# Patient Record
Sex: MALE | Race: WHITE | NOT HISPANIC OR LATINO | Employment: OTHER | URBAN - NONMETROPOLITAN AREA
[De-identification: names, ages, dates, MRNs, and addresses within clinical notes are randomized per-mention and may not be internally consistent; named-entity substitution may affect disease eponyms.]

---

## 2019-11-08 ENCOUNTER — OFFICE VISIT (OUTPATIENT)
Dept: FAMILY MEDICINE CLINIC | Facility: CLINIC | Age: 24
End: 2019-11-08

## 2019-11-08 VITALS
HEIGHT: 72 IN | HEART RATE: 64 BPM | SYSTOLIC BLOOD PRESSURE: 120 MMHG | BODY MASS INDEX: 20.86 KG/M2 | DIASTOLIC BLOOD PRESSURE: 78 MMHG | OXYGEN SATURATION: 97 % | WEIGHT: 154 LBS | TEMPERATURE: 98.3 F

## 2019-11-08 DIAGNOSIS — Z76.89 ENCOUNTER TO ESTABLISH CARE: ICD-10-CM

## 2019-11-08 DIAGNOSIS — D69.0 HSP (HENOCH SCHONLEIN PURPURA) (HCC): ICD-10-CM

## 2019-11-08 DIAGNOSIS — G89.4 CHRONIC PAIN SYNDROME: Primary | ICD-10-CM

## 2019-11-08 PROCEDURE — 99203 OFFICE O/P NEW LOW 30 MIN: CPT | Performed by: FAMILY MEDICINE

## 2019-11-08 RX ORDER — BUPRENORPHINE AND NALOXONE 8; 2 MG/1; MG/1
1 FILM, SOLUBLE BUCCAL; SUBLINGUAL DAILY
COMMUNITY
End: 2019-11-13

## 2019-11-08 NOTE — PROGRESS NOTES
Subjective   Bryson Castillo is a 24 y.o. male.   Pt presents today with CC of Addiction Problem      History of Present Illness   1.  Patient is here to establish care.  #2 he reports that he has recently moved here from [facility].  He has a history of chronic pain.  He reports that when he was 15 years old he had a flare of HSP, he was treated temporarily with narcotics, over the years he became addicted, he was part of a Suboxone clinic there under Dr. Solorzano.. He states that he moved here to [place] for no particular reason.  He has rented a trailer here in town.  He has not found work yet.  His only medication is 8 mg Suboxone.  He reports that he has enough medicine to last month, thus allowing him time to get in with a Suboxone clinic.  No other concerns today.         The following portions of the patient's history were reviewed and updated as appropriate: allergies, current medications, past family history, past social history, past surgical history and problem list.    Review of Systems   Constitutional: Negative for chills, fever and unexpected weight loss.   HENT: Negative for congestion and sore throat.    Eyes: Negative for blurred vision and visual disturbance.   Respiratory: Negative for cough and wheezing.    Cardiovascular: Negative for chest pain and palpitations.   Gastrointestinal: Negative for abdominal pain and diarrhea.   Endocrine: Negative for cold intolerance and heat intolerance.   Genitourinary: Negative for dysuria.   Musculoskeletal: Negative for arthralgias and neck stiffness.   Neurological: Negative for dizziness, seizures and syncope.   Psychiatric/Behavioral: Negative for self-injury, suicidal ideas and depressed mood.       Objective   Physical Exam   Constitutional: He is oriented to person, place, and time. He appears well-developed and well-nourished.   HENT:   Head: Normocephalic and atraumatic.   Right Ear: External ear normal.   Left Ear: External ear normal.   Nose:  Nose normal.   Mouth/Throat: Oropharynx is clear and moist.   Eyes: Conjunctivae and EOM are normal. Pupils are equal, round, and reactive to light.   Neck: Normal range of motion. Neck supple.   Cardiovascular: Normal rate, regular rhythm and normal heart sounds.   Pulmonary/Chest: Effort normal and breath sounds normal.   Abdominal: Soft. Bowel sounds are normal.   Neurological: He is alert and oriented to person, place, and time.   Skin: Skin is warm and dry.   Psychiatric: He has a normal mood and affect. His behavior is normal.   Vitals reviewed.        Assessment/Plan   Bryson was seen today for addiction problem.    Diagnoses and all orders for this visit:    Chronic pain syndrome  -     Ambulatory Referral to Psychiatry  Will refer him to psychiatry.  He has no other concerns today.  His pain is currently well controlled.  Encounter to establish care  He is to follow-up in 6 months for annual physical.  HSP (Henoch Schonlein purpura) (CMS/HCC)  He has a history of this, it is not currently active.                 Patient's Body mass index is 20.89 kg/m². BMI is above normal parameters. Recommendations include: exercise counseling and nutrition counseling.

## 2019-11-13 ENCOUNTER — OFFICE VISIT (OUTPATIENT)
Dept: PSYCHIATRY | Facility: CLINIC | Age: 24
End: 2019-11-13

## 2019-11-13 ENCOUNTER — LAB (OUTPATIENT)
Dept: LAB | Facility: HOSPITAL | Age: 24
End: 2019-11-13

## 2019-11-13 VITALS
BODY MASS INDEX: 21.48 KG/M2 | WEIGHT: 158.6 LBS | HEART RATE: 60 BPM | SYSTOLIC BLOOD PRESSURE: 124 MMHG | HEIGHT: 72 IN | DIASTOLIC BLOOD PRESSURE: 76 MMHG

## 2019-11-13 DIAGNOSIS — F11.21 OPIOID USE DISORDER, MODERATE, IN SUSTAINED REMISSION, DEPENDENCE (HCC): Primary | ICD-10-CM

## 2019-11-13 DIAGNOSIS — Z79.899 MEDICATION MANAGEMENT: ICD-10-CM

## 2019-11-13 DIAGNOSIS — Z72.89 OTHER PROBLEMS RELATED TO LIFESTYLE: ICD-10-CM

## 2019-11-13 DIAGNOSIS — F11.11 OPIOID ABUSE, IN REMISSION (HCC): ICD-10-CM

## 2019-11-13 DIAGNOSIS — F11.21 OPIOID USE DISORDER, MODERATE, IN SUSTAINED REMISSION, DEPENDENCE (HCC): ICD-10-CM

## 2019-11-13 LAB
ALBUMIN SERPL-MCNC: 4.4 G/DL (ref 3.5–5.2)
ALBUMIN/GLOB SERPL: 1.3 G/DL
ALP SERPL-CCNC: 93 U/L (ref 39–117)
ALT SERPL W P-5'-P-CCNC: 14 U/L (ref 1–41)
AMPHETAMINE CUT-OFF: ABNORMAL
ANION GAP SERPL CALCULATED.3IONS-SCNC: 9.2 MMOL/L (ref 5–15)
AST SERPL-CCNC: 15 U/L (ref 1–40)
BASOPHILS # BLD AUTO: 0.03 10*3/MM3 (ref 0–0.2)
BASOPHILS NFR BLD AUTO: 0.4 % (ref 0–1.5)
BENZODIAZIPINE CUT-OFF: ABNORMAL
BILIRUB SERPL-MCNC: 1.4 MG/DL (ref 0.2–1.2)
BUN BLD-MCNC: 11 MG/DL (ref 6–20)
BUN/CREAT SERPL: 12.8 (ref 7–25)
BUPRENORPHINE CUT-OFF: ABNORMAL
CALCIUM SPEC-SCNC: 9.5 MG/DL (ref 8.6–10.5)
CHLORIDE SERPL-SCNC: 99 MMOL/L (ref 98–107)
CO2 SERPL-SCNC: 30.8 MMOL/L (ref 22–29)
COCAINE CUT-OFF: ABNORMAL
CREAT BLD-MCNC: 0.86 MG/DL (ref 0.76–1.27)
DEPRECATED RDW RBC AUTO: 42.7 FL (ref 37–54)
EOSINOPHIL # BLD AUTO: 0.06 10*3/MM3 (ref 0–0.4)
EOSINOPHIL NFR BLD AUTO: 0.8 % (ref 0.3–6.2)
ERYTHROCYTE [DISTWIDTH] IN BLOOD BY AUTOMATED COUNT: 13 % (ref 12.3–15.4)
EXTERNAL AMPHETAMINE SCREEN URINE: NEGATIVE
EXTERNAL BENZODIAZEPINE SCREEN URINE: NEGATIVE
EXTERNAL BUPRENORPHINE SCREEN URINE: POSITIVE
EXTERNAL COCAINE SCREEN URINE: NEGATIVE
EXTERNAL MDMA: NEGATIVE
EXTERNAL METHADONE SCREEN URINE: NEGATIVE
EXTERNAL METHAMPHETAMINE SCREEN URINE: NEGATIVE
EXTERNAL OPIATES SCREEN URINE: NEGATIVE
EXTERNAL OXYCODONE SCREEN URINE: NEGATIVE
EXTERNAL THC SCREEN URINE: NEGATIVE
GFR SERPL CREATININE-BSD FRML MDRD: 108 ML/MIN/1.73
GLOBULIN UR ELPH-MCNC: 3.4 GM/DL
GLUCOSE BLD-MCNC: 80 MG/DL (ref 65–99)
HAV IGM SERPL QL IA: NORMAL
HBV CORE IGM SERPL QL IA: NORMAL
HBV SURFACE AG SERPL QL IA: NORMAL
HCT VFR BLD AUTO: 41.8 % (ref 37.5–51)
HCV AB SER DONR QL: NORMAL
HGB BLD-MCNC: 13.5 G/DL (ref 13–17.7)
HIV1+2 AB SER QL: NORMAL
IMM GRANULOCYTES # BLD AUTO: 0.01 10*3/MM3 (ref 0–0.05)
IMM GRANULOCYTES NFR BLD AUTO: 0.1 % (ref 0–0.5)
LYMPHOCYTES # BLD AUTO: 1.53 10*3/MM3 (ref 0.7–3.1)
LYMPHOCYTES NFR BLD AUTO: 21.7 % (ref 19.6–45.3)
MCH RBC QN AUTO: 29.2 PG (ref 26.6–33)
MCHC RBC AUTO-ENTMCNC: 32.3 G/DL (ref 31.5–35.7)
MCV RBC AUTO: 90.3 FL (ref 79–97)
MDMA CUT-OFF: ABNORMAL
METHADONE CUT-OFF: ABNORMAL
METHAMPHETAMINE CUT-OFF: ABNORMAL
MONOCYTES # BLD AUTO: 0.49 10*3/MM3 (ref 0.1–0.9)
MONOCYTES NFR BLD AUTO: 6.9 % (ref 5–12)
NEUTROPHILS # BLD AUTO: 4.94 10*3/MM3 (ref 1.7–7)
NEUTROPHILS NFR BLD AUTO: 70.1 % (ref 42.7–76)
NRBC BLD AUTO-RTO: 0 /100 WBC (ref 0–0.2)
OPIATES CUT-OFF: ABNORMAL
OXYCODONE CUT-OFF: ABNORMAL
PLATELET # BLD AUTO: 247 10*3/MM3 (ref 140–450)
PMV BLD AUTO: 11.5 FL (ref 6–12)
POTASSIUM BLD-SCNC: 4.3 MMOL/L (ref 3.5–5.2)
PROT SERPL-MCNC: 7.8 G/DL (ref 6–8.5)
RBC # BLD AUTO: 4.63 10*6/MM3 (ref 4.14–5.8)
SODIUM BLD-SCNC: 139 MMOL/L (ref 136–145)
T4 FREE SERPL-MCNC: 1.31 NG/DL (ref 0.93–1.7)
THC CUT-OFF: ABNORMAL
TSH SERPL DL<=0.05 MIU/L-ACNC: 2.23 UIU/ML (ref 0.27–4.2)
WBC NRBC COR # BLD: 7.06 10*3/MM3 (ref 3.4–10.8)

## 2019-11-13 PROCEDURE — 84439 ASSAY OF FREE THYROXINE: CPT

## 2019-11-13 PROCEDURE — 84443 ASSAY THYROID STIM HORMONE: CPT

## 2019-11-13 PROCEDURE — 80074 ACUTE HEPATITIS PANEL: CPT

## 2019-11-13 PROCEDURE — 36415 COLL VENOUS BLD VENIPUNCTURE: CPT

## 2019-11-13 PROCEDURE — 80053 COMPREHEN METABOLIC PANEL: CPT

## 2019-11-13 PROCEDURE — G0432 EIA HIV-1/HIV-2 SCREEN: HCPCS

## 2019-11-13 PROCEDURE — 85025 COMPLETE CBC W/AUTO DIFF WBC: CPT | Performed by: NURSE PRACTITIONER

## 2019-11-13 PROCEDURE — 99204 OFFICE O/P NEW MOD 45 MIN: CPT | Performed by: NURSE PRACTITIONER

## 2019-11-13 RX ORDER — BUPRENORPHINE HYDROCHLORIDE AND NALOXONE HYDROCHLORIDE DIHYDRATE 8; 2 MG/1; MG/1
1 TABLET SUBLINGUAL DAILY
Qty: 13 TABLET | Refills: 0 | Status: SHIPPED | OUTPATIENT
Start: 2019-11-13 | End: 2019-11-25 | Stop reason: SDUPTHER

## 2019-11-25 ENCOUNTER — OFFICE VISIT (OUTPATIENT)
Dept: PSYCHIATRY | Facility: CLINIC | Age: 24
End: 2019-11-25

## 2019-11-25 VITALS
SYSTOLIC BLOOD PRESSURE: 130 MMHG | WEIGHT: 157.4 LBS | HEIGHT: 72 IN | HEART RATE: 77 BPM | BODY MASS INDEX: 21.32 KG/M2 | DIASTOLIC BLOOD PRESSURE: 77 MMHG

## 2019-11-25 DIAGNOSIS — F11.21 OPIOID USE DISORDER, MODERATE, IN SUSTAINED REMISSION, DEPENDENCE (HCC): Primary | ICD-10-CM

## 2019-11-25 DIAGNOSIS — Z79.899 MEDICATION MANAGEMENT: ICD-10-CM

## 2019-11-25 LAB
AMPHETAMINE CUT-OFF: ABNORMAL
BENZODIAZIPINE CUT-OFF: ABNORMAL
BUPRENORPHINE CUT-OFF: ABNORMAL
COCAINE CUT-OFF: ABNORMAL
EXTERNAL AMPHETAMINE SCREEN URINE: NEGATIVE
EXTERNAL BENZODIAZEPINE SCREEN URINE: NEGATIVE
EXTERNAL BUPRENORPHINE SCREEN URINE: POSITIVE
EXTERNAL COCAINE SCREEN URINE: NEGATIVE
EXTERNAL MDMA: NEGATIVE
EXTERNAL METHADONE SCREEN URINE: NEGATIVE
EXTERNAL METHAMPHETAMINE SCREEN URINE: NEGATIVE
EXTERNAL OPIATES SCREEN URINE: NEGATIVE
EXTERNAL OXYCODONE SCREEN URINE: NEGATIVE
EXTERNAL THC SCREEN URINE: NEGATIVE
MDMA CUT-OFF: ABNORMAL
METHADONE CUT-OFF: ABNORMAL
METHAMPHETAMINE CUT-OFF: ABNORMAL
OPIATES CUT-OFF: ABNORMAL
OXYCODONE CUT-OFF: ABNORMAL
THC CUT-OFF: ABNORMAL

## 2019-11-25 PROCEDURE — 99212 OFFICE O/P EST SF 10 MIN: CPT | Performed by: NURSE PRACTITIONER

## 2019-11-25 RX ORDER — BUPRENORPHINE HYDROCHLORIDE AND NALOXONE HYDROCHLORIDE DIHYDRATE 8; 2 MG/1; MG/1
1 TABLET SUBLINGUAL DAILY
Qty: 7 TABLET | Refills: 0 | Status: SHIPPED | OUTPATIENT
Start: 2019-11-25 | End: 2019-12-02 | Stop reason: SDUPTHER

## 2019-11-26 ENCOUNTER — PROCEDURE VISIT (OUTPATIENT)
Dept: PSYCHIATRY | Facility: CLINIC | Age: 24
End: 2019-11-26

## 2019-11-26 DIAGNOSIS — F11.21 OPIOID USE DISORDER, MODERATE, IN SUSTAINED REMISSION, DEPENDENCE (HCC): Primary | ICD-10-CM

## 2019-11-26 PROCEDURE — 90853 GROUP PSYCHOTHERAPY: CPT | Performed by: SOCIAL WORKER

## 2019-11-28 NOTE — PROGRESS NOTES
"Patient ID: Bryson Castillo is a 24 y.o. male    SERVICE TYPE: EVALUATION for MAT      /76   Pulse 60   Ht 182.9 cm (72\")   Wt 71.9 kg (158 lb 9.6 oz)   BMI 21.51 kg/m²     ALLERGIES:  Morphine    CC/ Focus of the visit:  \"I take suboxone and I just moved here and need to establish care\".    HPI: This is a 23 y/o male who is currently taking Buprenorphine/Naloxone 8/2 mg one tablet daily presents to establish care for this medication. He recently established care with Dr. Justin Dupont as his PCP and he was referred here for the buprenorphine. He has a history of HSP first diagnosed at age 6.He has a chronic pain syndrome related to this. He had a bad flare at age 6 and then again at age 12. He was born and raised in [place]. His parents were , but both are supportive. He has one brother who is currently in shelter due to drugs. He has a good relationship with both of his parents. He lived in [place] for a time and then moved to [place]. He lives here with his girlfriend and her 3 y/o son. He is hoping to go to college this spring to get a degree in computer science. His girlfriend works at a local factory.    Substance Abuse History: He started using marijuana at around age 12. He had been prescribed narcotics for his HSP and he feels he became addicted at that time and took pain pills, but mainly he used heroin. He states that he used multiple drugs, but his drug of choice was Heroin. He did use meth. He has used IV drugs. He went to rehab at least twice and shelter once. He would get sober, but then start using again. He had an OD. He then was started on buprenorphine by a provider in [place] and has been clean and sober for almost 2 years. While is [place] he used and he moved to [place] to get away from other users. His girlfriend does not and has not used drugs. He has never really used alcohol. He did smoke, but quit that approximately 2 years ago as well.    PFSH:  Father-living in his " 40's-no health problems  Mother-living in her 40's-no health problems, may over use prescription pills.  Brother-living in his 20's-drug abuse  Review of Systems    SUPPORTIVE PSYCHOTHERAPY: continuing efforts to promote the therapeutic alliance, address the patient’s issues, and strengthen self awareness, insights, and coping skills.       Mental Status Exam  Appearance:  clean and casually dressed, appropriate  Attitude toward clinician:  cooperative and agreeable   Speech:    Rate:  regular rate and rhythm   Volume: normal  Motor:  no abnormal movements present  Mood:  Good  Affect:  euthymic  Thought Processes:  linear, logical, and goal directed  Thought Content:  Normal   Feeling Hopeless: absent  Suicidal Thoughts:  absent  Homicidal Thoughts:  absent  Perceptual Disturbance: no perceptual disturbance  Attention and Concentration:  good  Insight and Judgement:  good  Memory:  memory appears to be intact    Physical Exam:  HEENT: Head atraumatic, pupils equally round reactive to light bilaterally, nares pale and boggy with clear secretions.  Mouth mucosa moist pink and intact  Neck: Supple, no lymphadenopathy  Heart: Regular rate and rhythm without murmur  Lungs: Clear throughout all to auscultation  Ext: No edema or cyanosis.  Moves all extremities normally.  Neuro: Cranial 2 through 12 are grossly intact  Skin: Pink warm and dry and intact.  No rashes or lesions.    LABS:   Recent Results (from the past 168 hour(s))   [facility] Drug Screen    Collection Time: 11/25/19  9:58 AM   Result Value Ref Range    External Amphetamine Screen Urine Negative     Amphetamine Cut-Off 1000ng/ml     External Benzodiazepine Screen Urine Negative     Benzodiazipine Cut-Off 300ng/ml     External Cocaine Screen Urine Negative     Cocaine Cut-Off 300ng/ml     External THC Screen Urine Negative     THC Cut-Off 50ng/ml     External Methadone Screen Urine Negative     Methadone Cut-Off 300ng/ml     External Methamphetamine Screen  Urine Negative     Methamphetamine Cut-Off 1000ng/ml     External Oxycodone Screen Urine Negative     Oxycodone Cut-Off 100ng/ml     External Buprenorphine Screen Urine Positive     Buprenorphine Cut-Off 10ng/ml     External MDMA Negative     MDMA Cut-Off 500ng/ml     External Opiates Screen Urine Negative     Opiates Cut-Off 300ng/ml        MEDICATION ISSUES: Have discussed with the patient the medications Risks, Benefits, and Side effects including potential falls, possible impaired driving and  metabolic adversities among others. No medication side effects or related complaints today.     TREATMENT PLAN/GOALS: Continue supportive psychotherapy efforts and medications as indicated.     Current Outpatient Medications   Medication Sig Dispense Refill   • buprenorphine-naloxone (SUBOXONE) 8-2 MG per SL tablet Place 1 tablet under the tongue Daily. 7 tablet 0     No current facility-administered medications for this visit.        COLLATERAL PSYCHOTHERAPEUTIC INTERVENTION: Patient's insurance will allow him to partake in IOP Level 2.    RISK:  Denies SI, denies depression, denies anxiety    Assessment/Plan   Will order Buprenorphine/naloxone 8/2 mg 1 tab daily. Will check screening lab. UDS was appropriate. Signed the controlled contract. Scott is appropriate. RTC in 2 weeks. He will start Phase II IOP after they find  for his girlfriend's son.  Diagnoses and all orders for this visit:    Opioid use disorder, moderate, in sustained remission, dependence (CMS/HCC)  -     CBC & Differential  -     Comprehensive Metabolic Panel; Future  -     HIV-1 & HIV-2 Antibodies; Future  -     Hepatitis Panel, Acute; Future  -     TSH; Future  -     T4, Free; Future  -     CBC Auto Differential    Medication management  -     [facility] Drug Screen  -     CBC & Differential  -     Comprehensive Metabolic Panel; Future  -     HIV-1 & HIV-2 Antibodies; Future  -     Hepatitis Panel, Acute; Future  -     TSH; Future  -     T4,  Free; Future  -     CBC Auto Differential    Opioid abuse, in remission (CMS/HCC)   -     Hepatitis Panel, Acute; Future    Other problems related to lifestyle   -     HIV-1 & HIV-2 Antibodies; Future    Other orders  -     Discontinue: buprenorphine-naloxone (SUBOXONE) 8-2 MG per SL tablet; Place 1 tablet under the tongue Daily.        Return in 12 days (on 11/25/2019) for Recheck.         Patient knows to call if symptoms worsen or fail to improve between appointments.        This document has been electronically signed by SELVIN Coyle, LILY  November 27, 2019 10:13 PM

## 2019-11-29 NOTE — PROGRESS NOTES
"Patient ID: Bryson Castillo is a 24 y.o. male    SERVICE TYPE: EVALUATION for MAT      /77   Pulse 77   Ht 182.9 cm (72\")   Wt 71.4 kg (157 lb 6.4 oz)   BMI 21.35 kg/m²     ALLERGIES:  Morphine    CC/ Focus of the visit:  MAT follow-up    HPI: This is a 25 y/o male who is currently taking Buprenorphine/Naloxone 8/2 mg one tablet daily presents to establish care for this medication. He recently established care with Dr. Justin Dupont as his PCP and he was referred here for the buprenorphine. He has a history of HSP first diagnosed at age 6.He has a chronic pain syndrome related to this. He had a bad flare at age 6 and then again at age 12. He was born and raised in [place]. His parents were , but both are supportive. He has one brother who is currently in care home due to drugs. He has a good relationship with both of his parents. He lived in [place] for a time and then moved to [place]. He lives here with his girlfriend and her 3 y/o son. He is hoping to go to college this spring to get a degree in computer science. His girlfriend works at a local factory. Bryson's insurance will not cover Phase I IOP, but will Phase II, so he is going to start that next week, as he and his girlfriend found  for her son.    Substance Abuse History: He started using marijuana at around age 12. He had been prescribed narcotics for his HSP and he feels he became addicted at that time and took pain pills, but mainly he used heroin. He states that he used multiple drugs, but his drug of choice was Heroin. He did use meth. He has used IV drugs. He went to rehab at least twice and care home once. He would get sober, but then start using again. He had an OD. He then was started on buprenorphine by a provider in [place] and has been clean and sober for almost 2 years. While is [place] he used and he moved to [place] to get away from other users. His girlfriend does not and has not used drugs. He has never really used " alcohol. He did smoke, but quit that approximately 2 years ago as well.    PFSH:  Father-living in his 40's-no health problems  Mother-living in her 40's-no health problems, may over use prescription pills.  Brother-living in his 20's-drug abuse  Review of Systems    SUPPORTIVE PSYCHOTHERAPY: continuing efforts to promote the therapeutic alliance, address the patient’s issues, and strengthen self awareness, insights, and coping skills.       Mental Status Exam  Appearance:  clean and casually dressed, appropriate  Attitude toward clinician:  cooperative and agreeable   Speech:    Rate:  regular rate and rhythm   Volume: normal  Motor:  no abnormal movements present  Mood:  Good  Affect:  euthymic  Thought Processes:  linear, logical, and goal directed  Thought Content:  Normal   Feeling Hopeless: absent  Suicidal Thoughts:  absent  Homicidal Thoughts:  absent  Perceptual Disturbance: no perceptual disturbance  Attention and Concentration:  good  Insight and Judgement:  good  Memory:  memory appears to be intact    Physical Exam:  HEENT: Head atraumatic, pupils equally round reactive to light bilaterally, nares pale and boggy with clear secretions.  Mouth mucosa moist pink and intact  Neck: Supple, no lymphadenopathy  Heart: Regular rate and rhythm without murmur  Lungs: Clear throughout all to auscultation  Ext: No edema or cyanosis.  Moves all extremities normally.  Neuro: Cranial 2 through 12 are grossly intact  Skin: Pink warm and dry and intact.  No rashes or lesions.    LABS:   Recent Results (from the past 168 hour(s))   [facility] Drug Screen    Collection Time: 11/25/19  9:58 AM   Result Value Ref Range    External Amphetamine Screen Urine Negative     Amphetamine Cut-Off 1000ng/ml     External Benzodiazepine Screen Urine Negative     Benzodiazipine Cut-Off 300ng/ml     External Cocaine Screen Urine Negative     Cocaine Cut-Off 300ng/ml     External THC Screen Urine Negative     THC Cut-Off 50ng/ml     External  Methadone Screen Urine Negative     Methadone Cut-Off 300ng/ml     External Methamphetamine Screen Urine Negative     Methamphetamine Cut-Off 1000ng/ml     External Oxycodone Screen Urine Negative     Oxycodone Cut-Off 100ng/ml     External Buprenorphine Screen Urine Positive     Buprenorphine Cut-Off 10ng/ml     External MDMA Negative     MDMA Cut-Off 500ng/ml     External Opiates Screen Urine Negative     Opiates Cut-Off 300ng/ml        MEDICATION ISSUES: Have discussed with the patient the medications Risks, Benefits, and Side effects including potential falls, possible impaired driving and  metabolic adversities among others. No medication side effects or related complaints today.     TREATMENT PLAN/GOALS: Continue supportive psychotherapy efforts and medications as indicated.     Current Outpatient Medications   Medication Sig Dispense Refill   • buprenorphine-naloxone (SUBOXONE) 8-2 MG per SL tablet Place 1 tablet under the tongue Daily. 7 tablet 0     No current facility-administered medications for this visit.        COLLATERAL PSYCHOTHERAPEUTIC INTERVENTION: Patient's insurance will allow him to partake in IOP Level 2.    RISK:  Denies SI, denies depression, denies anxiety    Assessment/Plan   Will order Buprenorphine/naloxone 8/2 mg 1 tab daily. We reviewed his screening lab and the CBC, TSH/Free T4, CMP were all normal. Hepatitis panel and HIV were negative. He will start IOP next week. RTC on 12-2-19.       Diagnoses and all orders for this visit:    Opioid use disorder, moderate, in sustained remission, dependence (CMS/HCC)  -     [facility] Drug Screen  -     buprenorphine-naloxone (SUBOXONE) 8-2 MG per SL tablet; Place 1 tablet under the tongue Daily.    Medication management  -     [facility] Drug Screen  -     buprenorphine-naloxone (SUBOXONE) 8-2 MG per SL tablet; Place 1 tablet under the tongue Daily.        Return in 7 days (on 12/2/2019) for Recheck.         Patient knows to call if symptoms worsen or  fail to improve between appointments.        This document has been electronically signed by SELVIN Coyle DNP  November 29, 2019 9:44 AM

## 2019-12-02 ENCOUNTER — OFFICE VISIT (OUTPATIENT)
Dept: PSYCHIATRY | Facility: CLINIC | Age: 24
End: 2019-12-02

## 2019-12-02 VITALS
SYSTOLIC BLOOD PRESSURE: 135 MMHG | HEART RATE: 80 BPM | DIASTOLIC BLOOD PRESSURE: 72 MMHG | BODY MASS INDEX: 21.21 KG/M2 | WEIGHT: 156.6 LBS | HEIGHT: 72 IN

## 2019-12-02 DIAGNOSIS — Z79.899 MEDICATION MANAGEMENT: ICD-10-CM

## 2019-12-02 DIAGNOSIS — F11.21 OPIOID USE DISORDER, MODERATE, IN SUSTAINED REMISSION, DEPENDENCE (HCC): Primary | ICD-10-CM

## 2019-12-02 PROCEDURE — 99212 OFFICE O/P EST SF 10 MIN: CPT | Performed by: NURSE PRACTITIONER

## 2019-12-02 RX ORDER — BUPRENORPHINE HYDROCHLORIDE AND NALOXONE HYDROCHLORIDE DIHYDRATE 8; 2 MG/1; MG/1
1 TABLET SUBLINGUAL DAILY
Qty: 14 TABLET | Refills: 0 | Status: SHIPPED | OUTPATIENT
Start: 2019-12-02 | End: 2019-12-16 | Stop reason: SDUPTHER

## 2019-12-02 NOTE — GROUP NOTE
"November 26   1:00 PM   2:00 PM  SUBSTANCE ABUSE  IOP PHASE II GROUP NOTE    DATA:    1 hour group therapy session (Responsibility in Recovery)    Facilitated check-ins. Provided the Pie Chart Exercise from CBT workbook. Read from \"Just for Today\" the reading on responsibility for November 26th. Processed reading with the group.       RESPONSE: Positive participant. Reported his sobriety date was May 15th. He had just moved to [place] recently. He was still adjusting. His goal was to be connected with sober support during this transition.     CLINICAL MANUVERING/INTERVENTIONS: Assisted member in processing above session content; acknowledged and normalized patient's thoughts, feelings and concerns.    Allowed patient to freely discuss issues without interruption or judgment. Provided safe, confidential environment to facilitate the development of positive therapeutic relationship and encourage open, honest communication. Assisted patient in identifying risk factors which would indicate the need for higher level of care including thoughts to harm self or others and/or self-harming behavior and encouraged patient to contact this office, call 911, or present to the nearest emergency room should any of these events occur. Discussed crisis intervention services and means to access.  Patient adamantly and convincingly denies current suicidal or homicidal ideation or perceptual disturbance.    ASSESSMENT:  Engaged in activity/Process and self-disclosed: Yes or No  Affect ([place] all that apply) appropriate, blunted, flat, sad, angry, tearful, anxious, bright  Applies topic to self: Yes or No  Able to give and receive feedback: Yes or No  Degree of insightful thinking: Least 1  2  3  4  5  6  7 8  9  10  Most    Urinalysis:   No visits with results within 1 Day(s) from this visit.   Latest known visit with results is:   Office Visit on 11/25/2019   Component Date Value Ref Range Status   • External Amphetamine Screen Urine " 11/25/2019 Negative   Final   • Amphetamine Cut-Off 11/25/2019 1000ng/ml   Final   • External Benzodiazepine Screen Uri* 11/25/2019 Negative   Final   • Benzodiazipine Cut-Off 11/25/2019 300ng/ml   Final   • External Cocaine Screen Urine 11/25/2019 Negative   Final   • Cocaine Cut-Off 11/25/2019 300ng/ml   Final   • External THC Screen Urine 11/25/2019 Negative   Final   • THC Cut-Off 11/25/2019 50ng/ml   Final   • External Methadone Screen Urine 11/25/2019 Negative   Final   • Methadone Cut-Off 11/25/2019 300ng/ml   Final   • External Methamphetamine Screen Ur* 11/25/2019 Negative   Final   • Methamphetamine Cut-Off 11/25/2019 1000ng/ml   Final   • External Oxycodone Screen Urine 11/25/2019 Negative   Final   • Oxycodone Cut-Off 11/25/2019 100ng/ml   Final   • External Buprenorphine Screen Urine 11/25/2019 Positive   Final   • Buprenorphine Cut-Off 11/25/2019 10ng/ml   Final   • External MDMA 11/25/2019 Negative   Final   • MDMA Cut-Off 11/25/2019 500ng/ml   Final   • External Opiates Screen Urine 11/25/2019 Negative   Final   • Opiates Cut-Off 11/25/2019 300ng/ml   Final       12-Step attendance: Frequency/Sponsor? Not yet attended meetings. Resource list provided.    Identification of environmental and personal barriers to recovery: Recent transition to [place].     Motivation for treatment: Stability.     Mental Status Exam  Hygiene:  good  Dress:  casual  Attitude:  Cooperative  Motor Activity:  Appropriate  Speech:  Normal  Mood:  euthymic  Affect:  calm and pleasant  Thought Processes:  Goal directed and Linear  Thought Content:  normal  Suicidal Thoughts:  denies  Homicidal Thoughts:  denies  Crisis Safety Plan: yes, to come to the emergency room.  Hallucinations:  denies    Assessment     Diagnoses and all orders for this visit:    Opioid use disorder, moderate, in sustained remission, dependence (CMS/HCC)               PLAN:   Patient will continue in bi-weekly group psychotherapy sessions and individual  outpatient psychotherapy sessions every 3-4 weeks and will continue in self-help meetings and seek additional treatment if necessary following completion.

## 2019-12-06 ENCOUNTER — PROCEDURE VISIT (OUTPATIENT)
Dept: PSYCHIATRY | Facility: CLINIC | Age: 24
End: 2019-12-06

## 2019-12-06 DIAGNOSIS — F11.21 OPIOID USE DISORDER, MODERATE, IN SUSTAINED REMISSION, DEPENDENCE (HCC): Primary | ICD-10-CM

## 2019-12-06 DIAGNOSIS — F11.11 OPIOID ABUSE, IN REMISSION (HCC): ICD-10-CM

## 2019-12-06 PROCEDURE — 90853 GROUP PSYCHOTHERAPY: CPT | Performed by: SOCIAL WORKER

## 2019-12-12 DIAGNOSIS — F11.11 OPIOID ABUSE, IN REMISSION (HCC): ICD-10-CM

## 2019-12-12 DIAGNOSIS — F11.21 NARCOTIC DEPENDENCE, IN REMISSION (HCC): Primary | ICD-10-CM

## 2019-12-16 ENCOUNTER — OFFICE VISIT (OUTPATIENT)
Dept: PSYCHIATRY | Facility: CLINIC | Age: 24
End: 2019-12-16

## 2019-12-16 VITALS
BODY MASS INDEX: 20.94 KG/M2 | WEIGHT: 154.6 LBS | DIASTOLIC BLOOD PRESSURE: 74 MMHG | HEART RATE: 85 BPM | HEIGHT: 72 IN | SYSTOLIC BLOOD PRESSURE: 132 MMHG

## 2019-12-16 DIAGNOSIS — F11.21 OPIOID USE DISORDER, MODERATE, IN SUSTAINED REMISSION, DEPENDENCE (HCC): Primary | ICD-10-CM

## 2019-12-16 DIAGNOSIS — Z79.899 MEDICATION MANAGEMENT: ICD-10-CM

## 2019-12-16 PROCEDURE — 99213 OFFICE O/P EST LOW 20 MIN: CPT | Performed by: NURSE PRACTITIONER

## 2019-12-16 RX ORDER — BUPRENORPHINE HYDROCHLORIDE AND NALOXONE HYDROCHLORIDE DIHYDRATE 8; 2 MG/1; MG/1
1 TABLET SUBLINGUAL DAILY
Qty: 14 TABLET | Refills: 0 | Status: SHIPPED | OUTPATIENT
Start: 2019-12-16 | End: 2019-12-30 | Stop reason: SDUPTHER

## 2019-12-17 NOTE — GROUP NOTE
"December 6   9:00 AM  10:00 AM  SUBSTANCE ABUSE  IOP PHASE II GROUP NOTE    DATA:    1 hour group therapy session (Risk vs. Risk Choices)    Facilitated check-ins. Emphasized importance of attending meetings. Discussed the risks of attending meetings compared to the risks of not attending. Provided the worksheet, \"Risk versus Risk Choices\" from Cognitive Behavioral Therapy Techniques: A Practitioner's Guide, Second Edition, by Trace Peck. Processed worksheet with group participants, suggesting they input the choice of going to a meeting into the worksheet.       RESPONSE: Patient arrived 10 minutes late to the group session. Largely quiet. Seemed withdrawn from group. Apparent difficulty feeling a part of the group, though this has not been verbalized. Reported his son was sick, which was stressful, and he was dealing with boredom at home. He had not yet begun attending support group meetings in the area as advised.     CLINICAL MANUVERING/INTERVENTIONS: Assisted member in processing above session content; acknowledged and normalized patient's thoughts, feelings and concerns.    Allowed patient to freely discuss issues without interruption or judgment. Provided safe, confidential environment to facilitate the development of positive therapeutic relationship and encourage open, honest communication. Assisted patient in identifying risk factors which would indicate the need for higher level of care including thoughts to harm self or others and/or self-harming behavior and encouraged patient to contact this office, call 911, or present to the nearest emergency room should any of these events occur. Discussed crisis intervention services and means to access.  Patient adamantly and convincingly denies current suicidal or homicidal ideation or perceptual disturbance.    ASSESSMENT:  Engaged in activity/Process and self-disclosed: Yes or No  Affect ([place] all that apply) appropriate, blunted, flat, sad, angry, " tearful, anxious, bright  Applies topic to self: Yes or No  Able to give and receive feedback: Yes or No  Degree of insightful thinking: Least 1  2  3  4  5  6  7 8  9  10  Most    Urinalysis:   No visits with results within 1 Day(s) from this visit.   Latest known visit with results is:   Office Visit on 12/02/2019   Component Date Value Ref Range Status   • External Amphetamine Screen Urine 12/02/2019 Negative   Final   • Amphetamine Cut-Off 12/02/2019 1000ng/ml   Final   • External Benzodiazepine Screen Uri* 12/02/2019 Negative   Final   • Benzodiazipine Cut-Off 12/02/2019 300ng/ml   Final   • External Cocaine Screen Urine 12/02/2019 Negative   Final   • Cocaine Cut-Off 12/02/2019 300ng/ml   Final   • External THC Screen Urine 12/02/2019 Negative   Final   • THC Cut-Off 12/02/2019 50ng/ml   Final   • External Methadone Screen Urine 12/02/2019 Negative   Final   • Methadone Cut-Off 12/02/2019 300ng/ml   Final   • External Methamphetamine Screen Ur* 12/02/2019 Negative   Final   • Methamphetamine Cut-Off 12/02/2019 1000ng/ml   Final   • External Oxycodone Screen Urine 12/02/2019 Negative   Final   • Oxycodone Cut-Off 12/02/2019 100ng/ml   Final   • External Buprenorphine Screen Urine 12/02/2019 Positive   Final   • Buprenorphine Cut-Off 12/02/2019 10ng/ml   Final   • External MDMA 12/02/2019 Negative   Final   • MDMA Cut-Off 12/02/2019 500ng/ml   Final   • External Opiates Screen Urine 12/02/2019 Negative   Final   • Opiates Cut-Off 12/02/2019 300ng/ml   Final       12-Step attendance: Frequency/Sponsor? No meetings attended. Resource list provided.     Identification of environmental and personal barriers to recovery: Few sources of support cultivated by the patient in this area.     Motivation for treatment: fair    Mental Status Exam  Hygiene:  good  Dress:  casual  Attitude:  Evasive  Motor Activity:  Appropriate  Speech:  Minimal  Mood:  euthymic  Affect:  calm and pleasant  Thought Processes:  Unable to  demonstrate  Thought Content:  normal  Suicidal Thoughts:  denies  Homicidal Thoughts:  denies  Crisis Safety Plan: yes, to come to the emergency room.  Hallucinations:  denies    Assessment     Diagnoses and all orders for this visit:    Opioid use disorder, moderate, in sustained remission, dependence (CMS/HCC)    Opioid abuse, in remission (CMS/HCC)                PLAN:   Patient will continue in bi-weekly group psychotherapy sessions and individual outpatient psychotherapy sessions every 3-4 weeks and will continue in self-help meetings and seek additional treatment if necessary following completion.

## 2019-12-19 NOTE — PROGRESS NOTES
"Patient ID: Bryson Castillo is a 24 y.o. male    SERVICE TYPE: EVALUATION for MAT      /72   Pulse 80   Ht 182.9 cm (72\")   Wt 71 kg (156 lb 9.6 oz)   BMI 21.24 kg/m²     ALLERGIES:  Morphine    CC/ Focus of the visit:  MAT follow-up    HPI: This is a 23 y/o male who is currently taking Buprenorphine/Naloxone 8/2 mg one tablet daily presents for follow-up.  Daiquiri states that he has been doing well.  Unfortunately his girlfriend's young child has been ill and has not been able to start  yet.  He has been taking care of him.  He therefore has not started our IOP.  The goal was having come to IOP at least twice a week.  He is also not found any meetings.  He said he is done an online meeting.  He states that he has been able to remain clean and sober.  Please see his history below:    Past History:He recently established care with Dr. Justin Dupont as his PCP and he was referred here for the buprenorphine. He has a history of HSP first diagnosed at age 6.He has a chronic pain syndrome related to this. He had a bad flare at age 6 and then again at age 12. He was born and raised in [place]. His parents were , but both are supportive. He has one brother who is currently in long-term due to drugs. He has a good relationship with both of his parents. He lived in [place] for a time and then moved to [place]. He lives here with his girlfriend and her 3 y/o son. He is hoping to go to college this spring to get a degree in computer science. His girlfriend works at a local factory. [employer] insurance will not cover Phase I IOP, but will Phase II, so he is going to start that next week, as he and his girlfriend found  for her son.    Substance Abuse History: He started using marijuana at around age 12. He had been prescribed narcotics for his HSP and he feels he became addicted at that time and took pain pills, but mainly he used heroin. He states that he used multiple drugs, but his drug of " choice was Heroin. He did use meth. He has used IV drugs. He went to rehab at least twice and correction once. He would get sober, but then start using again. He had an OD. He then was started on buprenorphine by a provider in [place] and has been clean and sober for almost 2 years. While is [place] he used and he moved to [place] to get away from other users. His girlfriend does not and has not used drugs. He has never really used alcohol. He did smoke, but quit that approximately 2 years ago as well.    PFSH:  Father-living in his 40's-no health problems  Mother-living in her 40's-no health problems, may over use prescription pills.  Brother-living in his 20's-drug abuse  Review of Systems   All other systems reviewed and are negative.      SUPPORTIVE PSYCHOTHERAPY: continuing efforts to promote the therapeutic alliance, address the patient’s issues, and strengthen self awareness, insights, and coping skills.       Mental Status Exam  Appearance:  clean and casually dressed, appropriate  Attitude toward clinician:  cooperative and agreeable   Speech:    Rate:  regular rate and rhythm   Volume: normal  Motor:  no abnormal movements present  Mood:  Good  Affect:  euthymic  Thought Processes:  linear, logical, and goal directed  Thought Content:  Normal   Feeling Hopeless: absent  Suicidal Thoughts:  absent  Homicidal Thoughts:  absent  Perceptual Disturbance: no perceptual disturbance  Attention and Concentration:  good  Insight and Judgement:  good  Memory:  memory appears to be intact    Physical Exam:  HEENT: Head atraumatic, pupils equally round reactive to light bilaterally, nares pale and boggy with clear secretions.  Mouth mucosa moist pink and intact  Neck: Supple, no lymphadenopathy  Heart: Regular rate and rhythm without murmur  Lungs: Clear throughout all to auscultation  Ext: No edema or cyanosis.  Moves all extremities normally.  Neuro: Cranial 2 through 12 are grossly intact  Skin: Pink warm and dry  and intact.  No rashes or lesions.    LABS:   Contains abnormal data [facility] Screen   Order: [id number]   Status:  Final result   Visible to patient:  No (Not Released)   Next appt:  12/20/2019 at 09:00 AM in Psychiatry ([address], [facility])   Dx:  Medication management   Component 2wk ago   External Amphetamine Screen Urine Negative    Amphetamine Cut-Off 1000ng/ml    External Benzodiazepine Screen Urine Negative    Benzodiazipine Cut-Off 300ng/ml    External Cocaine Screen Urine Negative    Cocaine Cut-Off 300ng/ml    External THC Screen Urine Negative    THC Cut-Off 50ng/ml    External Methadone Screen Urine Negative    Methadone Cut-Off 300ng/ml    External Methamphetamine Screen Urine Negative    Methamphetamine Cut-Off 1000ng/ml    External Oxycodone Screen Urine Negative    Oxycodone Cut-Off 100ng/ml    External Buprenorphine Screen Urine Positive    Buprenorphine Cut-Off 10ng/ml    External MDMA Negative    MDMA Cut-Off 500ng/ml    External Opiates Screen Urine Negative    Opiates Cut-Off 300ng/ml    Resulting Agency [facility] REFERENCE LABORATORY         Specimen Collected: 12/02/19 12:57 Last Resulted: 12/02/19 12:57 Lab Flowsheet Order Details View Encounter Lab and Collection Details Routing Result History               MEDICATION ISSUES: Have discussed with the patient the medications Risks, Benefits, and Side effects including potential falls, possible impaired driving and  metabolic adversities among others. No medication side effects or related complaints today.     TREATMENT PLAN/GOALS: Continue supportive psychotherapy efforts and medications as indicated.     Current Outpatient Medications   Medication Sig Dispense Refill   • buprenorphine-naloxone (SUBOXONE) 8-2 MG per SL tablet Place 1 tablet under the tongue Daily. 14 tablet 0     No current facility-administered medications for this visit.        COLLATERAL PSYCHOTHERAPEUTIC INTERVENTION: Patient's insurance will allow him to  partake in IOP Level 2.    RISK:  Denies SI, denies depression, denies anxiety    Assessment/Plan   Will order Buprenorphine/naloxone 8/2 mg 1 tab daily. We reviewed his screening lab and the CBC, TSH/Free T4, CMP were all normal. Hepatitis panel and HIV were negative. He will start IOP next week. RTC on .       Diagnoses and all orders for this visit:    Opioid use disorder, moderate, in sustained remission, dependence (CMS/HCC)  -     Discontinue: buprenorphine-naloxone (SUBOXONE) 8-2 MG per SL tablet; Place 1 tablet under the tongue Daily.    Medication management  -     [facility] Screen  -     Discontinue: buprenorphine-naloxone (SUBOXONE) 8-2 MG per SL tablet; Place 1 tablet under the tongue Daily.        Return in 1 week (on 12/9/2019) for Recheck.         Patient knows to call if symptoms worsen or fail to improve between appointments.        This document has been electronically signed by SELVIN Coyle DNP  December 19, 2019 11:21 AM

## 2019-12-30 ENCOUNTER — OFFICE VISIT (OUTPATIENT)
Dept: PSYCHIATRY | Facility: CLINIC | Age: 24
End: 2019-12-30

## 2019-12-30 VITALS
HEART RATE: 79 BPM | DIASTOLIC BLOOD PRESSURE: 77 MMHG | HEIGHT: 72 IN | WEIGHT: 156 LBS | SYSTOLIC BLOOD PRESSURE: 118 MMHG | BODY MASS INDEX: 21.13 KG/M2

## 2019-12-30 DIAGNOSIS — F11.21 OPIOID USE DISORDER, MODERATE, IN SUSTAINED REMISSION, DEPENDENCE (HCC): Primary | ICD-10-CM

## 2019-12-30 DIAGNOSIS — Z79.899 MEDICATION MANAGEMENT: ICD-10-CM

## 2019-12-30 PROCEDURE — 99213 OFFICE O/P EST LOW 20 MIN: CPT | Performed by: NURSE PRACTITIONER

## 2019-12-30 RX ORDER — BUPRENORPHINE HYDROCHLORIDE AND NALOXONE HYDROCHLORIDE DIHYDRATE 8; 2 MG/1; MG/1
1 TABLET SUBLINGUAL DAILY
Qty: 15 TABLET | Refills: 0 | Status: SHIPPED | OUTPATIENT
Start: 2019-12-30 | End: 2020-01-14 | Stop reason: SDUPTHER

## 2019-12-30 NOTE — PROGRESS NOTES
"Patient ID: Bryson Castillo is a 24 y.o. male    SERVICE TYPE: EVALUATION for MAT      /77   Pulse 79   Ht 182.9 cm (72\")   Wt 70.8 kg (156 lb)   BMI 21.16 kg/m²     ALLERGIES:  Morphine    CC/ Focus of the visit:  MAT follow-up    HPI: This is a 25 y/o male who is currently taking Buprenorphine/Naloxone 8/2 mg one tablet daily presents for follow-up.  Bryson states that he has been doing well.  Unfortunately his girlfriend's young child has been ill and has not been able to start  yet.  He has been taking care of him.  He therefore has not started our IOP.  The goal was having come to IOP at least twice a week.  He is also not found any meetings.  He said he is doing an online meeting.  He states that he has been able to remain clean and sober.  Please see his history below: Today he indicates he will be starting college classes in the next two weeks. He is concerned about trying to get to IOP. He has not yet went to outside groups.    Past History:He recently established care with Dr. Justin Dupont as his PCP and he was referred here for the buprenorphine. He has a history of HSP first diagnosed at age 6.He has a chronic pain syndrome related to this. He had a bad flare at age 6 and then again at age 12. He was born and raised in [place]. His parents were , but both are supportive. He has one brother who is currently in half-way due to drugs. He has a good relationship with both of his parents. He lived in [place] for a time and then moved to [place]. He lives here with his girlfriend and her 3 y/o son. He is hoping to go to college this spring to get a degree in computer science. His girlfriend works at a local factory. Bryson's insurance will not cover Phase I IOP, but will Phase II, so he is going to start that next week, as he and his girlfriend found  for her son.    Substance Abuse History: He started using marijuana at around age 12. He had been prescribed narcotics for his " HSP and he feels he became addicted at that time and took pain pills, but mainly he used heroin. He states that he used multiple drugs, but his drug of choice was Heroin. He did use meth. He has used IV drugs. He went to rehab at least twice and senior living once. He would get sober, but then start using again. He had an OD. He then was started on buprenorphine by a provider in [place] and has been clean and sober for almost 2 years. While is [place] he used and he moved to [place] to get away from other users. His girlfriend does not and has not used drugs. He has never really used alcohol. He did smoke, but quit that approximately 2 years ago as well.    PFSH:  Father-living in his 40's-no health problems  Mother-living in her 40's-no health problems, may over use prescription pills.  Brother-living in his 20's-drug abuse  Review of Systems   All other systems reviewed and are negative.      SUPPORTIVE PSYCHOTHERAPY: continuing efforts to promote the therapeutic alliance, address the patient’s issues, and strengthen self awareness, insights, and coping skills.       Mental Status Exam  Appearance:  clean and casually dressed, appropriate  Attitude toward clinician:  cooperative and agreeable   Speech:    Rate:  regular rate and rhythm   Volume: normal  Motor:  no abnormal movements present  Mood:  Good  Affect:  euthymic  Thought Processes:  linear, logical, and goal directed  Thought Content:  Normal   Feeling Hopeless: absent  Suicidal Thoughts:  absent  Homicidal Thoughts:  absent  Perceptual Disturbance: no perceptual disturbance  Attention and Concentration:  good  Insight and Judgement:  good  Memory:  memory appears to be intact    Physical Exam:  HEENT: Head atraumatic, pupils equally round reactive to light bilaterally, nares pale and boggy with clear secretions.  Mouth mucosa moist pink and intact  Neck: Supple, no lymphadenopathy  Heart: Regular rate and rhythm without murmur  Lungs: Clear throughout all  to auscultation  Ext: No edema or cyanosis.  Moves all extremities normally.  Neuro: Cranial 2 through 12 are grossly intact  Skin: Pink warm and dry and intact.  No rashes or lesions.    LABS:     [facility] Screen   Order: [id number]   Status:  Final result   Visible to patient:  No (Not Released)   Next appt:  01/14/2020 at 09:15 AM in Psychiatry (Annette Weber, [facility])   Dx:  Medication management   Component 09:48   External Amphetamine Screen Urine Negative    Amphetamine Cut-Off 1000ng/ml    External Benzodiazepine Screen Urine Negative    Benzodiazipine Cut-Off 300ng/ml    External Cocaine Screen Urine Negative    Cocaine Cut-Off 300ng/ml    External THC Screen Urine Negative    THC Cut-Off 50ng/ml    External Methadone Screen Urine Negative    Methadone Cut-Off 300ng/ml    External Methamphetamine Screen Urine Negative    Methamphetamine Cut-Off 1000ng/ml    External Oxycodone Screen Urine Negative    Oxycodone Cut-Off 100ng/ml    External Buprenorphine Screen Urine Positive    Buprenorphine Cut-Off 10ng/ml    External MDMA Negative    MDMA Cut-Off 500ng/ml    External Opiates Screen Urine Negative    Opiates Cut-Off 300ng/ml    Resulting [facility] REFERENCE LABORATORY         Specimen Collected: 12/30/19 09:48 Last Resulted: 12/30/19 09:48 Lab Flowsheet Order Details View Encounter Lab and Collection Details Routing Result History             MEDICATION ISSUES: Have discussed with the patient the medications Risks, Benefits, and Side effects including potential falls, possible impaired driving and  metabolic adversities among others. No medication side effects or related complaints today.     TREATMENT PLAN/GOALS: Continue supportive psychotherapy efforts and medications as indicated.     Current Outpatient Medications   Medication Sig Dispense Refill   • buprenorphine-naloxone (SUBOXONE) 8-2 MG per SL tablet Place 1 tablet under the tongue Daily. 15 tablet 0     No current  facility-administered medications for this visit.        COLLATERAL PSYCHOTHERAPEUTIC INTERVENTION: Patient's insurance will allow him to partake in IOP Level 2.    RISK:  Denies SI, denies depression, denies anxiety    Assessment/Plan   Will order Buprenorphine/naloxone 8/2 mg 1 tab daily. I would really like Bryson to come to Phase II IOP, however, he has had normal UDSs each time and comes to all appointments. I have told the patient if he comes every two weeks and his UDSs are clean and he goes to outside meetings and to his classes, then if he doesn't make it to IOP, I wont be as concerned. He states an understanding. He denies having anxiety or depression. He is sleeping and eating well. He states he missed his family over [place], but otherwise they had a nice holiday. RTC 1-14-20.       Diagnoses and all orders for this visit:    Opioid use disorder, moderate, in sustained remission, dependence (CMS/HCC)  -     [facility] Screen  -     buprenorphine-naloxone (SUBOXONE) 8-2 MG per SL tablet; Place 1 tablet under the tongue Daily.    Medication management  -     [facility] Screen  -     buprenorphine-naloxone (SUBOXONE) 8-2 MG per SL tablet; Place 1 tablet under the tongue Daily.        Return in 15 days (on 1/14/2020) for Recheck.         Patient knows to call if symptoms worsen or fail to improve between appointments.        This document has been electronically signed by SELVIN Coyle DNP  December 30, 2019 3:58 PM

## 2020-01-01 NOTE — PROGRESS NOTES
"Patient ID: Bryson Castillo is a 24 y.o. male    SERVICE TYPE: EVALUATION for MAT      /74   Pulse 85   Ht 182.9 cm (72\")   Wt 70.1 kg (154 lb 9.6 oz)   BMI 20.97 kg/m²     ALLERGIES:  Morphine    CC/ Focus of the visit:  MAT follow-up    HPI: This is a 23 y/o male who is currently taking Buprenorphine/Naloxone 8/2 mg one tablet daily presents for follow-up.  Bryson states that he has been doing well.  Unfortunately his girlfriend's young child had been ill and has not been able to start  yet. They were also having trouble with their cars and the one that worked his girlfriend needs to go to work.  He has been taking care of him.  He therefore has not started our IOP.  The goal was having come to IOP at least twice a week.  He is also not found any meetings.  He said he is done an online meeting.  He states that he has been able to remain clean and sober.  Please see his history below:    Past History:He recently established care with Dr. Justin Dupont as his PCP and he was referred here for the buprenorphine. He has a history of HSP first diagnosed at age 6.He has a chronic pain syndrome related to this. He had a bad flare at age 6 and then again at age 12. He was born and raised in [place]. His parents were , but both are supportive. He has one brother who is currently in senior living due to drugs. He has a good relationship with both of his parents. He lived in [place] for a time and then moved to [place]. He lives here with his girlfriend and her 1 y/o son. He is hoping to go to college this spring to get a degree in computer science. His girlfriend works at a local factory. Bryson's insurance will not cover Phase I IOP, but will Phase II, so he is going to start that next week, as he and his girlfriend found  for her son.    Substance Abuse History: He started using marijuana at around age 12. He had been prescribed narcotics for his HSP and he feels he became addicted at that time " and took pain pills, but mainly he used heroin. He states that he used multiple drugs, but his drug of choice was Heroin. He did use meth. He has used IV drugs. He went to rehab at least twice and snf once. He would get sober, but then start using again. He had an OD. He then was started on buprenorphine by a provider in [place] and has been clean and sober for almost 2 years. While is [place] he used and he moved to [place] to get away from other users. His girlfriend does not and has not used drugs. He has never really used alcohol. He did smoke, but quit that approximately 2 years ago as well.    PFSH:  Father-living in his 40's-no health problems  Mother-living in her 40's-no health problems, may over use prescription pills.  Brother-living in his 20's-drug abuse  Review of Systems   All other systems reviewed and are negative.      SUPPORTIVE PSYCHOTHERAPY: continuing efforts to promote the therapeutic alliance, address the patient’s issues, and strengthen self awareness, insights, and coping skills.       Mental Status Exam  Appearance:  clean and casually dressed, appropriate  Attitude toward clinician:  cooperative and agreeable   Speech:    Rate:  regular rate and rhythm   Volume: normal  Motor:  no abnormal movements present  Mood:  Good  Affect:  euthymic  Thought Processes:  linear, logical, and goal directed  Thought Content:  Normal   Feeling Hopeless: absent  Suicidal Thoughts:  absent  Homicidal Thoughts:  absent  Perceptual Disturbance: no perceptual disturbance  Attention and Concentration:  good  Insight and Judgement:  good  Memory:  memory appears to be intact    Physical Exam:  HEENT: Head atraumatic, pupils equally round reactive to light bilaterally, nares pale and boggy with clear secretions.  Mouth mucosa moist pink and intact  Neck: Supple, no lymphadenopathy  Heart: Regular rate and rhythm without murmur  Lungs: Clear throughout all to auscultation  Ext: No edema or cyanosis.   Moves all extremities normally.  Neuro: Cranial 2 through 12 are grossly intact  Skin: Pink warm and dry and intact.  No rashes or lesions.    LABS:   [facility] Drug Screen   Order: [id number]   Status:  Final result   Visible to patient:  No (Not Released) Next appt:  01/14/2020 at 09:15 AM in Psychiatry ([facility]) Dx:  Medication management   Component 2wk ago   External Amphetamine Screen Urine Negative    Amphetamine Cut-Off 1000ng/ml    External Benzodiazepine Screen Urine Negative    Benzodiazipine Cut-Off 300ng/ml    External Cocaine Screen Urine Negative    Cocaine Cut-Off 300ng/ml    External THC Screen Urine Negative    THC Cut-Off 50ng/ml    External Methadone Screen Urine Negative    Methadone Cut-Off 300ng/ml    External Methamphetamine Screen Urine Negative    Methamphetamine Cut-Off 1000ng/ml    External Oxycodone Screen Urine Negative    Oxycodone Cut-Off 100ng/ml    External Buprenorphine Screen Urine Positive    Buprenorphine Cut-Off 10ng/ml    External MDMA Negative    MDMA Cut-Off 500ng/ml    External Opiates Screen Urine Negative    Opiates Cut-Off 300ng/ml    Resulting [facility]         Specimen Collected: 12/16/19 09:15 Last Resulted: 12/16/19 09:15 Lab Flowsheet Order Details View Encounter Lab and Collection Details Routing Result History            Routing History     Priority Sent On From To Message Type    12/16/2019  9:16 AM Abbi Valles MA Messinger, Catherine Joy, APRN Results   Result Read / Acknowledged     User Time Read / Acknowledged   Annette Weber APRN 12/16/2019 10:09 AM       MEDICATION ISSUES: Have discussed with the patient the medications Risks, Benefits, and Side effects including potential falls, possible impaired driving and  metabolic adversities among others. No medication side effects or related complaints today.     TREATMENT PLAN/GOALS: Continue supportive psychotherapy efforts and medications as  indicated.     Current Outpatient Medications   Medication Sig Dispense Refill   • buprenorphine-naloxone (SUBOXONE) 8-2 MG per SL tablet Place 1 tablet under the tongue Daily. 15 tablet 0     No current facility-administered medications for this visit.        COLLATERAL PSYCHOTHERAPEUTIC INTERVENTION: Patient's insurance will allow him to partake in IOP Level 2.    RISK:  Denies SI, denies depression, denies anxiety    Assessment/Plan   Will order Buprenorphine/naloxone 8/2 mg 1 tab daily. His UDS have been negative. He is doing well. I encouraged him to attend IOP or outside meetings. He states an understanding.     Diagnoses and all orders for this visit:    Opioid use disorder, moderate, in sustained remission, dependence (CMS/[facility])  -     [facility] Screen  -     Discontinue: buprenorphine-naloxone (SUBOXONE) 8-2 MG per SL tablet; Place 1 tablet under the tongue Daily.    Medication management  -     [facility] Drug Screen  -     Discontinue: buprenorphine-naloxone (SUBOXONE) 8-2 MG per SL tablet; Place 1 tablet under the tongue Daily.        Return in 2 weeks (on 12/30/2019) for Recheck.         Patient knows to call if symptoms worsen or fail to improve between appointments.        This document has been electronically signed by SELVIN Coyle DNP  January 1, 2020 8:45 AM

## 2020-01-14 DIAGNOSIS — F11.21 OPIOID USE DISORDER, MODERATE, IN SUSTAINED REMISSION, DEPENDENCE (HCC): ICD-10-CM

## 2020-01-14 DIAGNOSIS — Z79.899 MEDICATION MANAGEMENT: ICD-10-CM

## 2020-01-14 RX ORDER — BUPRENORPHINE HYDROCHLORIDE AND NALOXONE HYDROCHLORIDE DIHYDRATE 8; 2 MG/1; MG/1
1 TABLET SUBLINGUAL DAILY
Qty: 14 TABLET | Refills: 0 | Status: SHIPPED | OUTPATIENT
Start: 2020-01-14 | End: 2020-01-21 | Stop reason: SDUPTHER

## 2020-01-21 ENCOUNTER — OFFICE VISIT (OUTPATIENT)
Dept: PSYCHIATRY | Facility: CLINIC | Age: 25
End: 2020-01-21

## 2020-01-21 VITALS
HEIGHT: 72 IN | WEIGHT: 151 LBS | HEART RATE: 87 BPM | DIASTOLIC BLOOD PRESSURE: 73 MMHG | BODY MASS INDEX: 20.45 KG/M2 | SYSTOLIC BLOOD PRESSURE: 118 MMHG

## 2020-01-21 DIAGNOSIS — F11.21 OPIOID USE DISORDER, MODERATE, IN SUSTAINED REMISSION, DEPENDENCE (HCC): Primary | ICD-10-CM

## 2020-01-21 DIAGNOSIS — Z79.899 MEDICATION MANAGEMENT: ICD-10-CM

## 2020-01-21 PROCEDURE — 99213 OFFICE O/P EST LOW 20 MIN: CPT | Performed by: NURSE PRACTITIONER

## 2020-01-21 RX ORDER — BUPRENORPHINE HYDROCHLORIDE AND NALOXONE HYDROCHLORIDE DIHYDRATE 8; 2 MG/1; MG/1
1 TABLET SUBLINGUAL DAILY
Qty: 21 TABLET | Refills: 0 | Status: SHIPPED | OUTPATIENT
Start: 2020-01-21 | End: 2020-02-11 | Stop reason: SDUPTHER

## 2020-02-11 ENCOUNTER — TELEPHONE (OUTPATIENT)
Dept: PSYCHIATRY | Facility: HOSPITAL | Age: 25
End: 2020-02-11

## 2020-02-11 DIAGNOSIS — F11.21 OPIOID USE DISORDER, MODERATE, IN SUSTAINED REMISSION, DEPENDENCE (HCC): ICD-10-CM

## 2020-02-11 DIAGNOSIS — Z79.899 MEDICATION MANAGEMENT: Primary | ICD-10-CM

## 2020-02-11 DIAGNOSIS — Z79.899 MEDICATION MANAGEMENT: ICD-10-CM

## 2020-02-11 LAB
AMPHETAMINE CUT-OFF: ABNORMAL
BENZODIAZIPINE CUT-OFF: ABNORMAL
BUPRENORPHINE CUT-OFF: ABNORMAL
COCAINE CUT-OFF: ABNORMAL
EXTERNAL AMPHETAMINE SCREEN URINE: POSITIVE
EXTERNAL BENZODIAZEPINE SCREEN URINE: NEGATIVE
EXTERNAL BUPRENORPHINE SCREEN URINE: POSITIVE
EXTERNAL COCAINE SCREEN URINE: NEGATIVE
EXTERNAL MDMA: NEGATIVE
EXTERNAL METHADONE SCREEN URINE: NEGATIVE
EXTERNAL METHAMPHETAMINE SCREEN URINE: POSITIVE
EXTERNAL OPIATES SCREEN URINE: NEGATIVE
EXTERNAL OXYCODONE SCREEN URINE: POSITIVE
EXTERNAL THC SCREEN URINE: NEGATIVE
MDMA CUT-OFF: ABNORMAL
METHADONE CUT-OFF: ABNORMAL
METHAMPHETAMINE CUT-OFF: ABNORMAL
OPIATES CUT-OFF: ABNORMAL
OXYCODONE CUT-OFF: ABNORMAL
THC CUT-OFF: ABNORMAL

## 2020-02-11 RX ORDER — BUPRENORPHINE HYDROCHLORIDE AND NALOXONE HYDROCHLORIDE DIHYDRATE 8; 2 MG/1; MG/1
1 TABLET SUBLINGUAL DAILY
Qty: 14 TABLET | Refills: 0 | Status: SHIPPED | OUTPATIENT
Start: 2020-02-11

## 2020-02-11 NOTE — TELEPHONE ENCOUNTER
I am not sure what a vacation write off is. He should be able to fill the medication today or tomorrow. Thanks!

## 2020-02-16 NOTE — PROGRESS NOTES
"Patient ID: Bryson Castillo is a 24 y.o. male    SERVICE TYPE: EVALUATION for MAT      /73   Pulse 87   Ht 182.9 cm (72.01\")   Wt 68.5 kg (151 lb)   BMI 20.47 kg/m²     ALLERGIES:  Morphine    CC/ Focus of the visit:  MAT follow-up    HPI: This is a 23 y/o male who is currently taking Buprenorphine/Naloxone 8/2 mg one tablet daily presents for follow-up.  Bryson states that he has been doing well.  Unfortunately his girlfriend's young child has been ill and has not been able to start  yet.  He has been taking care of him.  He therefore has not started our IOP.  The goal was having come to IOP at least twice a week.  He is also not found any meetings.  He said he is doing an online meeting.  He states that he has been able to remain clean and sober.  Please see his history below: Today he indicates he will be starting college classes in the next two weeks. He is concerned about trying to get to IOP. He has not yet went to outside groups. He has been watching his girlfriend's son as he has been sick a lot this winter.    Past History:He recently established care with Dr. Justin Dupont as his PCP and he was referred here for the buprenorphine. He has a history of HSP first diagnosed at age 6.He has a chronic pain syndrome related to this. He had a bad flare at age 6 and then again at age 12. He was born and raised in [place]. His parents were , but both are supportive. He has one brother who is currently in senior care due to drugs. He has a good relationship with both of his parents. He lived in [place] for a time and then moved to [place]. He lives here with his girlfriend and her 1 y/o son. He is hoping to go to college this spring to get a degree in computer science. His girlfriend works at a local factory. Bryson's insurance will not cover Phase I IOP, but will Phase II, so he is going to start that next week, as he and his girlfriend found  for her son.    Substance Abuse History: He " started using marijuana at around age 12. He had been prescribed narcotics for his HSP and he feels he became addicted at that time and took pain pills, but mainly he used heroin. He states that he used multiple drugs, but his drug of choice was Heroin. He did use meth. He has used IV drugs. He went to rehab at least twice and [facility] once. He would get sober, but then start using again. He had an OD. He then was started on buprenorphine by a provider in [place] and has been clean and sober for almost 2 years. While is [place] he used and he moved to [place] to get away from other users. His girlfriend does not and has not used drugs. He has never really used alcohol. He did smoke, but quit that approximately 2 years ago as well.    PFSH:  Father-living in his 40's-no health problems  Mother-living in her 40's-no health problems, may over use prescription pills.  Brother-living in his 20's-drug abuse  Review of Systems   All other systems reviewed and are negative.      SUPPORTIVE PSYCHOTHERAPY: continuing efforts to promote the therapeutic alliance, address the patient’s issues, and strengthen self awareness, insights, and coping skills.       Mental Status Exam  Appearance:  clean and casually dressed, appropriate  Attitude toward clinician:  cooperative and agreeable   Speech:    Rate:  regular rate and rhythm   Volume: normal  Motor:  no abnormal movements present  Mood:  Good  Affect:  euthymic  Thought Processes:  linear, logical, and goal directed  Thought Content:  Normal   Feeling Hopeless: absent  Suicidal Thoughts:  absent  Homicidal Thoughts:  absent  Perceptual Disturbance: no perceptual disturbance  Attention and Concentration:  good  Insight and Judgement:  good  Memory:  memory appears to be intact    Physical Exam:  HEENT: Head atraumatic, pupils equally round reactive to light bilaterally, nares pale and boggy with clear secretions.  Mouth mucosa moist pink and intact  Neck: Supple, no  lymphadenopathy  Heart: Regular rate and rhythm without murmur  Lungs: Clear throughout all to auscultation  Ext: No edema or cyanosis.  Moves all extremities normally.  Neuro: Cranial 2 through 12 are grossly intact  Skin: Pink warm and dry and intact.  No rashes or lesions.    LABS:   Drug Screen   Order: [id number]   Status:  Final result   Visible to patient:  No (Not Released) Next appt:  02/25/2020 at 10:30 AM in Psychiatry ([facility]) Dx:  Medication management   Specimen Information: Urine, Clean Catch        Component 3wk ago   External Amphetamine Screen Urine Negative    Amphetamine Cut-Off 1000ng/ml    External Benzodiazepine Screen Urine Negative    Benzodiazipine Cut-Off 300ng/ml    External Cocaine Screen Urine Negative    Cocaine Cut-Off 300ng/ml    External THC Screen Urine Negative    THC Cut-Off 50ng/ml    External Methadone Screen Urine Negative    Methadone Cut-Off 300ng/ml    External Methamphetamine Screen Urine Negative    Methamphetamine Cut-Off 1000ng/ml    External Oxycodone Screen Urine Negative    Oxycodone Cut-Off 100ng/ml    External Buprenorphine Screen Urine Positive    Buprenorphine Cut-Off 10ng/ml    External MDMA Negative    MDMA Cut-Off 500ng/ml    External Opiates Screen Urine Negative    Opiates Cut-Off 300ng./ml    Resulting Agency [facility]         Specimen Collected: 01/21/20 10:25 Last Resulted: 01/21/20 10:25 Lab Flowsheet Order Details View Encounter Lab and Collection Details Routing Result History            Routing History     Priority Sent On From To Message Type    1/21/2020 10:26 AM Abbi Valles MA Messinger, Catherine Joy, APRN Results   Result Read / Acknowledged     User Time Read / Acknowledged   Annette Weber APRN 1/22/2020  1:18 PM       MEDICATION ISSUES: Have discussed with the patient the medications Risks, Benefits, and Side effects including potential falls, possible impaired driving and   metabolic adversities among others. No medication side effects or related complaints today.     TREATMENT PLAN/GOALS: Continue supportive psychotherapy efforts and medications as indicated.     Current Outpatient Medications   Medication Sig Dispense Refill   • buprenorphine-naloxone (SUBOXONE) 8-2 MG per SL tablet Place 1 tablet under the tongue Daily. 14 tablet 0     No current facility-administered medications for this visit.        COLLATERAL PSYCHOTHERAPEUTIC INTERVENTION: Patient's insurance will allow him to partake in IOP Level 2.    RISK:  Denies SI, denies depression, denies anxiety    Assessment/Plan   Will order Buprenorphine/naloxone 8/2 mg 1 tab daily. I would really like Bryson to come to Phase II IOP, however, he has had normal UDSs each time and comes to all appointments. I have told the patient if he comes every two weeks and his UDSs are clean and he goes to outside meetings and to his classes, then if he doesn't make it to IOP, I wont be as concerned. He states an understanding. He denies having anxiety or depression. He is sleeping and eating well. RTC in 2 weeks.     Diagnoses and all orders for this visit:    Opioid use disorder, moderate, in sustained remission, dependence (CMS/HCC)  -     Discontinue: buprenorphine-naloxone (SUBOXONE) 8-2 MG per SL tablet; Place 1 tablet under the tongue Daily.    Medication management  -     [facility] Drug Screen  -     Discontinue: buprenorphine-naloxone (SUBOXONE) 8-2 MG per SL tablet; Place 1 tablet under the tongue Daily.        Return in 2 weeks (on 2/4/2020) for Recheck.         Patient knows to call if symptoms worsen or fail to improve between appointments.        This document has been electronically signed by SELVIN Coyle, LILY  February 15, 2020 9:03 PM

## 2023-12-07 ENCOUNTER — APPOINTMENT (RX ONLY)
Dept: URBAN - METROPOLITAN AREA CLINIC 70 | Facility: CLINIC | Age: 28
Setting detail: DERMATOLOGY
End: 2023-12-07

## 2023-12-07 DIAGNOSIS — L65.9 NONSCARRING HAIR LOSS, UNSPECIFIED: ICD-10-CM

## 2023-12-07 PROCEDURE — 11104 PUNCH BX SKIN SINGLE LESION: CPT

## 2023-12-07 PROCEDURE — ? BIOPSY BY PUNCH METHOD

## 2023-12-07 ASSESSMENT — LOCATION SIMPLE DESCRIPTION DERM: LOCATION SIMPLE: RIGHT FOREHEAD

## 2023-12-07 ASSESSMENT — LOCATION ZONE DERM: LOCATION ZONE: FACE

## 2023-12-07 ASSESSMENT — LOCATION DETAILED DESCRIPTION DERM: LOCATION DETAILED: RIGHT SUPERIOR LATERAL FOREHEAD

## 2023-12-07 NOTE — PROCEDURE: BIOPSY BY PUNCH METHOD
Detail Level: Detailed
Was A Bandage Applied: Yes
Punch Size In Mm: 4
Size Of Lesion In Cm (Optional): 0
Depth Of Punch Biopsy: dermis
Biopsy Type: H and E
Anesthesia Type: 1% lidocaine with epinephrine
Anesthesia Volume In Cc: 0.5
Hemostasis: None
Epidermal Sutures: 4-0 Nylon
Wound Care: Petrolatum
Dressing: bandage
Patient Will Remove Sutures At Home?: No
Lab: 6
Consent: Written consent was obtained and risks were reviewed including but not limited to scarring, infection, bleeding, scabbing, incomplete removal and allergy to anesthesia.
Post-Care Instructions: I reviewed with the patient in detail post-care instructions. Patient is to keep the biopsy site dry overnight, and then apply bacitracin twice daily until healed. Patient may apply hydrogen peroxide soaks to remove any crusting.
Home Suture Removal Text: Patient was provided a home suture removal kit and will remove their sutures at home.  If they have any questions or difficulties they will call the office.
Notification Instructions: Patient will be notified of biopsy results. However, patient instructed to call the office if not contacted within 2 weeks.
Billing Type: Third-Party Bill
Information: Selecting Yes will display possible errors in your note based on the variables you have selected. This validation is only offered as a suggestion for you. PLEASE NOTE THAT THE VALIDATION TEXT WILL BE REMOVED WHEN YOU FINALIZE YOUR NOTE. IF YOU WANT TO FAX A PRELIMINARY NOTE YOU WILL NEED TO TOGGLE THIS TO 'NO' IF YOU DO NOT WANT IT IN YOUR FAXED NOTE.